# Patient Record
Sex: FEMALE | Race: WHITE | URBAN - METROPOLITAN AREA
[De-identification: names, ages, dates, MRNs, and addresses within clinical notes are randomized per-mention and may not be internally consistent; named-entity substitution may affect disease eponyms.]

---

## 2024-01-26 ENCOUNTER — HOME CARE VISIT (OUTPATIENT)
Age: 64
End: 2024-01-26

## 2024-01-26 NOTE — HOSPICE
Met with patient and spouse Dann reviewed hospice philosophy and goals of care.  Patient alert and oriented x4 and gave a brief review of current diagnosis of Lung cancer that has metastasized to brain and lymph nodes.  Patient had a right cerebellar mass excision in 11/2023 and had to have a hematoma removed from same area 12/2023.  Patient not sure if wants to continue to have any further treatment as she does not feel it will prolong her life and is afraid of some of the side effects from treatments offered including chemotherapy and radiation.  After long discussion patient and spouse are in agreement with hospice goals of care.  Patient has a home in [facility] and both she and spouse spend at least 50% of their time there. Dann stated that as patient declines, they will spend majority if not all of their time there.  After much discussion they are thinking about choosing a hospice agency that services that area. Information folder left with patient.